# Patient Record
Sex: MALE | Race: BLACK OR AFRICAN AMERICAN
[De-identification: names, ages, dates, MRNs, and addresses within clinical notes are randomized per-mention and may not be internally consistent; named-entity substitution may affect disease eponyms.]

---

## 2017-11-15 ENCOUNTER — HOSPITAL ENCOUNTER (EMERGENCY)
Dept: HOSPITAL 72 - EMR | Age: 52
Discharge: HOME | End: 2017-11-15
Payer: COMMERCIAL

## 2017-11-15 VITALS — HEIGHT: 72 IN | BODY MASS INDEX: 26.41 KG/M2 | WEIGHT: 195 LBS

## 2017-11-15 VITALS — SYSTOLIC BLOOD PRESSURE: 133 MMHG | DIASTOLIC BLOOD PRESSURE: 89 MMHG

## 2017-11-15 VITALS — DIASTOLIC BLOOD PRESSURE: 89 MMHG | SYSTOLIC BLOOD PRESSURE: 133 MMHG

## 2017-11-15 VITALS — DIASTOLIC BLOOD PRESSURE: 88 MMHG | SYSTOLIC BLOOD PRESSURE: 127 MMHG
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
## 2017-11-15 DIAGNOSIS — S00.83XA: ICD-10-CM
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
## 2017-11-15 DIAGNOSIS — S60.222A: Primary | ICD-10-CM

## 2017-11-15 PROCEDURE — 99283 EMERGENCY DEPT VISIT LOW MDM: CPT
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
## 2017-11-16 NOTE — DIAGNOSTIC IMAGING REPORT
Indication: PAIN plantar surface pain



Technique: 3 views left hand



Comparison: none



Findings: There is mild degenerative narrowing of the second through fifth 

distal

interphalangeal joints, particularly the fifth. No acute fractures. No 

dislocations.

Joint spaces are preserved. No radiopaque foreign body



Impression: Mild degenerative changes. No acute bony trauma

## 2020-02-12 ENCOUNTER — HOSPITAL ENCOUNTER (EMERGENCY)
Dept: HOSPITAL 72 - EMR | Age: 55
Discharge: HOME | End: 2020-02-12
Payer: COMMERCIAL

## 2020-02-12 VITALS — WEIGHT: 182 LBS | BODY MASS INDEX: 24.65 KG/M2 | HEIGHT: 72 IN

## 2020-02-12 VITALS — DIASTOLIC BLOOD PRESSURE: 95 MMHG | SYSTOLIC BLOOD PRESSURE: 140 MMHG
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
## 2020-02-12 DIAGNOSIS — S86.911A: Primary | ICD-10-CM
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
## 2020-02-12 DIAGNOSIS — E78.00: ICD-10-CM
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
## 2020-02-12 PROCEDURE — 99283 EMERGENCY DEPT VISIT LOW MDM: CPT

## 2020-02-12 NOTE — NUR
ED Nurse Note:



Pt walked into ED while on duty ([employer]) for follow up s/p twisting R knee/ankle 
while on a call on Jan. 29th. Pt states he was coming out of a building while 
on a call and stepped into a dip causing him to twist ankle and knee. Pt 
reports 3/10 aching pain to R knee, but ankle is not hurting at ths time. Pt is 
aaox4, no cardiac or respiratory distres, ambulatory with steady gait.

## 2020-02-13 NOTE — DIAGNOSTIC IMAGING REPORT
Indication: Knee pain, status post fall

 

Technique: 3 views of the right knee

 

Comparison: None

 

Findings: Bony alignment is normal. No acute fractures. No dislocations. The joint

spaces are preserved. No suprapatellar effusion

 

Impression: Negative

## 2020-02-14 NOTE — EMERGENCY ROOM REPORT
History of Present Illness


General


Chief Complaint:  Lower Extremity Injury


Source:  Patient





Present Illness


HPI


55-year-old male presents ED for evaluation.  Brought in by complaining of 

right knee pain.  Patient is a .  States on January 9 he twisted 

his right knee and ankle on the job.  States that since then his ankle pain is 

resolved but he has persistent right knee pain.  Dull, 3 out of 10, 

nonradiating.  Worse with twisting motions.  Is able to walk without 

difficulty.  No other aggravating relieving factors.  Denies any other 

associated symptoms


Allergies:  


Coded Allergies:  


     No Known Allergies (Unverified , 11/15/17)





Patient History


Past Medical History:  other - high cholesterol


Past Surgical History:  none


Pertinent Family History:  none


Social History:  Denies: smoking, alcohol use, drug use


Immunizations:  UTD


Reviewed Nursing Documentation:  PMH: Agreed; PSxH: Agreed





Nursing Documentation-PMH


Past Medical History:  No Stated History


Hx Cardiac Problems:  Yes - hypercholesterolemia





Review of Systems


All Other Systems:  negative except mentioned in HPI





Physical Exam





Vital Signs








  Date Time  Temp Pulse Resp B/P (MAP) Pulse Ox O2 Delivery O2 Flow Rate FiO2


 


2/12/20 20:10 98.2 72 18 150/94 (112) 95 Room Air  








Sp02 EP Interpretation:  reviewed, normal


General Appearance:  no apparent distress, alert, GCS 15, non-toxic


Head:  normocephalic, atraumatic


Eyes:  bilateral eye normal inspection, bilateral eye PERRL


ENT:  hearing grossly normal, normal pharynx, no angioedema, normal voice


Neck:  full range of motion, supple/symm/no masses


Respiratory:  chest non-tender, lungs clear, normal breath sounds, speaking 

full sentences


Cardiovascular #1:  regular rate, rhythm, no edema


Cardiovascular #2:  2+ carotid (R), 2+ carotid (L), 2+ radial (R), 2+ radial (L)

, 2+ dorsalis pedis (R), 2+ dorsalis pedis (L)


Gastrointestinal:  normal bowel sounds, non tender, soft, non-distended, no 

guarding, no rebound


Rectal:  deferred


Genitourinary:  normal inspection, no CVA tenderness


Musculoskeletal:  back normal, normal range of motion, gait/station normal, 

tender - Right knee


Neurologic:  alert, motor strength/tone normal, oriented x3, sensory intact, 

responsive, speech normal


Psychiatric:  judgement/insight normal, memory normal, mood/affect normal, no 

suicidal/homicidal ideation


Reflexes:  3+ bicep (R), 3+ bicep (L), 3+ tricep (R), 3+ tricep (L), 3+ knee (R)

, 3+ knee (L)


Skin:  no rash


Lymphatic:  no adenopathy





Medical Decision Making


Diagnostic Impression:  


 Primary Impression:  


 Knee strain


 Qualified Codes:  S86.911A - Strain of unspecified muscle(s) and tendon(s) at 

lower leg level, right leg, initial encounter


ER Course


Hospital Course 


55-year-old male presents with right knee pain





Differential diagnoses include: Fracture, dislocation, sprain, contusion





Clinical course


Patient placed on stretcher.  After initial history and physical, I ordered x-

ray right knee.  Patient declined pain meds





Xrays prelim read shows no acute fracture/dislocation.  I discussed findings 

with patient.  Likely sprain.  Recommend modified activity.  Ice, elevation.  

Patient declined Ace wrap.  I will provide orthopedic referrals.  Safe for 

discharge for close outpatient follow-up





Diagnosis -knee sprain 





Stable and discharged to home with prescription for Motrin.  apply ice, keep 

elevated.  weight bear as tolerated.  Followup with PMD/Ortho.  Return to ED if 

symptoms recur or worsen


Other X-Ray Diagnostic Results


Other X-Ray Diagnostic Results :  


   X-Ray ordered:  Right knee


   # of Views/Limited Vs Complete:  3 View


   Indication:  Pain


   EP Interpretation:  Yes


   Interpretation:  no dislocation, no soft tissue swelling, no fractures


   Impression:  No acute disease


   Electronically Signed by:  Electronically signed by Juan Luis Hilton MD





Last Vital Signs








  Date Time  Temp Pulse Resp B/P (MAP) Pulse Ox O2 Delivery O2 Flow Rate FiO2


 


2/12/20 21:00 98.2 75 18 140/95 95 Room Air  








Status:  improved


Disposition:  HOME, SELF-CARE


Condition:  Stable


Referrals:  


Tho Gipson MD











NON PHYSICIAN (PCP)


Departure Forms:  Return to Work      Return to Work Date:  Feb 13, 2020


   Work Restrictions:  None


Patient Instructions:  Knee Sprain, Easy-to-Read











Juan Luis Hilton MD Feb 14, 2020 07:28